# Patient Record
Sex: MALE | Race: BLACK OR AFRICAN AMERICAN | NOT HISPANIC OR LATINO | Employment: FULL TIME | URBAN - METROPOLITAN AREA
[De-identification: names, ages, dates, MRNs, and addresses within clinical notes are randomized per-mention and may not be internally consistent; named-entity substitution may affect disease eponyms.]

---

## 2019-02-26 ENCOUNTER — OFFICE VISIT (OUTPATIENT)
Dept: INTERNAL MEDICINE | Facility: CLINIC | Age: 39
End: 2019-02-26
Payer: COMMERCIAL

## 2019-02-26 VITALS
RESPIRATION RATE: 18 BRPM | OXYGEN SATURATION: 98 % | BODY MASS INDEX: 31.65 KG/M2 | DIASTOLIC BLOOD PRESSURE: 59 MMHG | TEMPERATURE: 98.3 F | HEIGHT: 72 IN | HEART RATE: 67 BPM | WEIGHT: 233.7 LBS | SYSTOLIC BLOOD PRESSURE: 105 MMHG

## 2019-02-26 DIAGNOSIS — K21.9 GASTROESOPHAGEAL REFLUX DISEASE, ESOPHAGITIS PRESENCE NOT SPECIFIED: ICD-10-CM

## 2019-02-26 DIAGNOSIS — R56.9 SEIZURES (H): ICD-10-CM

## 2019-02-26 DIAGNOSIS — Z00.00 HEALTHCARE MAINTENANCE: Primary | ICD-10-CM

## 2019-02-26 DIAGNOSIS — H54.40 BLINDNESS OF LEFT EYE: ICD-10-CM

## 2019-02-26 LAB
ERYTHROCYTE [DISTWIDTH] IN BLOOD BY AUTOMATED COUNT: 14 % (ref 10–15)
HCT VFR BLD AUTO: 44.9 % (ref 40–53)
HGB BLD-MCNC: 14.9 G/DL (ref 13.3–17.7)
MCH RBC QN AUTO: 30.8 PG (ref 26.5–33)
MCHC RBC AUTO-ENTMCNC: 33.2 G/DL (ref 31.5–36.5)
MCV RBC AUTO: 93 FL (ref 78–100)
PHENYTOIN SERPL-MCNC: 17.6 MG/L (ref 10–20)
PLATELET # BLD AUTO: 187 10E9/L (ref 150–450)
RBC # BLD AUTO: 4.83 10E12/L (ref 4.4–5.9)
WBC # BLD AUTO: 5.6 10E9/L (ref 4–11)

## 2019-02-26 PROCEDURE — 99385 PREV VISIT NEW AGE 18-39: CPT | Performed by: NURSE PRACTITIONER

## 2019-02-26 PROCEDURE — 36415 COLL VENOUS BLD VENIPUNCTURE: CPT | Performed by: NURSE PRACTITIONER

## 2019-02-26 PROCEDURE — 84443 ASSAY THYROID STIM HORMONE: CPT | Performed by: NURSE PRACTITIONER

## 2019-02-26 PROCEDURE — 84460 ALANINE AMINO (ALT) (SGPT): CPT | Performed by: NURSE PRACTITIONER

## 2019-02-26 PROCEDURE — 80061 LIPID PANEL: CPT | Performed by: NURSE PRACTITIONER

## 2019-02-26 PROCEDURE — 80048 BASIC METABOLIC PNL TOTAL CA: CPT | Performed by: NURSE PRACTITIONER

## 2019-02-26 PROCEDURE — 85027 COMPLETE CBC AUTOMATED: CPT | Performed by: NURSE PRACTITIONER

## 2019-02-26 PROCEDURE — 80185 ASSAY OF PHENYTOIN TOTAL: CPT | Performed by: NURSE PRACTITIONER

## 2019-02-26 RX ORDER — LANSOPRAZOLE 30 MG/1
30 CAPSULE, DELAYED RELEASE ORAL DAILY
Qty: 90 CAPSULE | Refills: 3 | Status: SHIPPED | OUTPATIENT
Start: 2019-02-26 | End: 2020-02-28

## 2019-02-26 RX ORDER — LANSOPRAZOLE 30 MG/1
30 CAPSULE, DELAYED RELEASE ORAL DAILY
COMMUNITY
End: 2019-02-26

## 2019-02-26 ASSESSMENT — ENCOUNTER SYMPTOMS
NERVOUS/ANXIOUS: 0
ABDOMINAL PAIN: 0
DIARRHEA: 0
HEMATOCHEZIA: 0
HEMATURIA: 0
DIZZINESS: 0
CONSTIPATION: 0
CHILLS: 0
EYE PAIN: 0
FEVER: 0

## 2019-02-26 ASSESSMENT — MIFFLIN-ST. JEOR: SCORE: 2018.06

## 2019-02-26 NOTE — LETTER
February 28, 2019      Sophy Lozano  [address] [facility] [address]        Dear ,    We are writing to inform you of your test results.  Your cholesterol is moderately elevated. I would like you to work harder on your diet, exercise, and weight loss for now. You will need a follow up fasting cholesterol in 6 months. Your glucose was also mildly elevated.  The rest of your results were in normal range.       Resulted Orders   CBC with platelets   Result Value Ref Range    WBC 5.6 4.0 - 11.0 10e9/L    RBC Count 4.83 4.4 - 5.9 10e12/L    Hemoglobin 14.9 13.3 - 17.7 g/dL    Hematocrit 44.9 40.0 - 53.0 %    MCV 93 78 - 100 fl    MCH 30.8 26.5 - 33.0 pg    MCHC 33.2 31.5 - 36.5 g/dL    RDW 14.0 10.0 - 15.0 %    Platelet Count 187 150 - 450 10e9/L   Basic metabolic panel   Result Value Ref Range    Sodium 140 133 - 144 mmol/L    Potassium 4.5 3.4 - 5.3 mmol/L    Chloride 105 94 - 109 mmol/L    Carbon Dioxide 27 20 - 32 mmol/L    Anion Gap 8 3 - 14 mmol/L    Glucose 101 (H) 70 - 99 mg/dL      Comment:      Fasting specimen    Urea Nitrogen 11 7 - 30 mg/dL    Creatinine 1.10 0.66 - 1.25 mg/dL    GFR Estimate 85 >60 mL/min/[1.73_m2]      Comment:      Non  GFR Calc  Starting 12/18/2018, serum creatinine based estimated GFR (eGFR) will be   calculated using the Chronic Kidney Disease Epidemiology Collaboration   (CKD-EPI) equation.      GFR Estimate If Black >90 >60 mL/min/[1.73_m2]      Comment:       GFR Calc  Starting 12/18/2018, serum creatinine based estimated GFR (eGFR) will be   calculated using the Chronic Kidney Disease Epidemiology Collaboration   (CKD-EPI) equation.      Calcium 9.0 8.5 - 10.1 mg/dL   Lipid Profile   Result Value Ref Range    Cholesterol 244 (H) <200 mg/dL      Comment:      Desirable:       <200 mg/dl    Triglycerides 220 (H) <150 mg/dL      Comment:      Borderline high:  150-199 mg/dl  High:             200-499 mg/dl  Very high:       >499  mg/dl  Fasting specimen      HDL Cholesterol 46 >39 mg/dL    LDL Cholesterol Calculated 154 (H) <100 mg/dL      Comment:      Above desirable:  100-129 mg/dl  Borderline High:  130-159 mg/dL  High:             160-189 mg/dL  Very high:       >189 mg/dl      Non HDL Cholesterol 198 (H) <130 mg/dL      Comment:      Above Desirable:  130-159 mg/dl  Borderline high:  160-189 mg/dl  High:             190-219 mg/dl  Very high:       >219 mg/dl     ALT   Result Value Ref Range    ALT 28 0 - 70 U/L   TSH with free T4 reflex   Result Value Ref Range    TSH 2.39 0.40 - 4.00 mU/L   Phenytoin level   Result Value Ref Range    Phenytoin Level 17.6 10 - 20 mg/L       If you have any questions or concerns, please call the clinic at the number listed above.       Sincerely,        Rica Ramirez NP

## 2019-02-26 NOTE — PROGRESS NOTES
SUBJECTIVE:   CC: Sophy Lozano is an 39 year old male who presents for preventative health visit.     Physical   Annual:     Getting at least 3 servings of Calcium per day:  Yes    Bi-annual eye exam:  Yes    Dental care twice a year:  Yes    Sleep apnea or symptoms of sleep apnea:  Daytime drowsiness    Diet:  Regular (no restrictions)    Frequency of exercise:  1 day/week    Duration of exercise:  45-60 minutes    Taking medications regularly:  Yes    Medication side effects:  Not applicable    Additional concerns today:  No    PHQ-2 Total Score: 0            Today's PHQ-2 Score:   PHQ-2 ( 1999 [employer]) 2/26/2019   Q1: Little interest or pleasure in doing things 0   Q2: Feeling down, depressed or hopeless 0   PHQ-2 Score 0   Q1: Little interest or pleasure in doing things Not at all   Q2: Feeling down, depressed or hopeless Not at all   PHQ-2 Score 0       Abuse: Current or Past(Physical, Sexual or Emotional)- No  Do you feel safe in your environment? Yes    Social History     Tobacco Use     Smoking status: Never Smoker     Smokeless tobacco: Never Used   Substance Use Topics     Alcohol use: No     Alcohol Use 2/26/2019   If you drink alcohol do you typically have greater than 3 drinks per day OR greater than 7 drinks per week? Not Applicable       Last PSA: No results found for: PSA    Reviewed orders with patient. Reviewed health maintenance and updated orders accordingly - Yes  BP Readings from Last 3 Encounters:   02/26/19 105/59   08/05/15 122/82   10/10/14 110/47    Wt Readings from Last 3 Encounters:   02/26/19 106 kg (233 lb 11.2 oz)                  Patient Active Problem List   Diagnosis     Seizures (H)     Blindness of left eye     GERD (gastroesophageal reflux disease)     Past Surgical History:   Procedure Laterality Date     AS REMOVAL OF EYE         Social History     Tobacco Use     Smoking status: Never Smoker     Smokeless tobacco: Never Used   Substance Use Topics     Alcohol use: No     Family  History   Problem Relation Age of Onset     Diabetes Mother      Coronary Artery Disease Father          Current Outpatient Medications   Medication Sig Dispense Refill     Acetaminophen (TYLENOL PO)        LANsoprazole (PREVACID) 30 MG DR capsule Take 1 capsule (30 mg) by mouth daily 90 capsule 3     Phenytoin (DILANTIN PO)        aspirin-acetaminophen-caffeine (EXCEDRIN MIGRAINE) 250-250-65 MG per tablet Take 1 tablet by mouth every 6 hours as needed for headaches         Reviewed and updated as needed this visit by clinical staff  Tobacco  Allergies  Meds  Med Hx  Surg Hx  Fam Hx  Soc Hx        Reviewed and updated as needed this visit by Provider            Review of Systems   Constitutional: Negative for chills and fever.   HENT: Negative for congestion and ear pain.    Eyes: Negative for pain.   Cardiovascular: Negative for chest pain.   Gastrointestinal: Negative for abdominal pain, constipation, diarrhea and hematochezia.   Genitourinary: Negative for hematuria.   Neurological: Negative for dizziness.   Psychiatric/Behavioral: The patient is not nervous/anxious.        OBJECTIVE:   /59 (BP Location: Left arm, Patient Position: Sitting, Cuff Size: Adult Regular)   Pulse 67   Temp 98.3  F (36.8  C) (Oral)   Resp 18   Ht 1.829 m (6')   Wt 106 kg (233 lb 11.2 oz)   SpO2 98%   BMI 31.70 kg/m        Physical Exam  GENERAL: healthy, alert and no distress  HENT: ear canals and TM's normal, nose and mouth without ulcers or lesions  NECK: no adenopathy, no asymmetry, masses, or scars and thyroid normal to palpation  RESP: lungs clear to auscultation - no rales, rhonchi or wheezes  CV: regular rate and rhythm, normal S1 S2, no S3 or S4, no murmur, click or rub, no peripheral edema and peripheral pulses strong  ABDOMEN: soft, nontender, no hepatosplenomegaly, no masses and bowel sounds normal  MS: no gross musculoskeletal defects noted, no edema  PSYCH: mentation appears normal, affect  normal/bright        ASSESSMENT/PLAN:       ICD-10-CM    1. Healthcare maintenance Z00.00 CBC with platelets     Basic metabolic panel     Lipid Profile     ALT     TSH with free T4 reflex   2. Seizures (H) R56.9 Phenytoin level   3. Blindness of left eye H54.40    4. Gastroesophageal reflux disease, esophagitis presence not specified K21.9 LANsoprazole (PREVACID) 30 MG DR capsule       COUNSELING:   Reviewed preventive health counseling, as reflected in patient instructions    BP Readings from Last 1 Encounters:   08/05/15 122/82     There is no height or weight on file to calculate BMI.           reports that  has never smoked. he has never used smokeless tobacco.      Counseling Resources:  ATP IV Guidelines  Pooled Cohorts Equation Calculator  FRAX Risk Assessment  ICSI Preventive Guidelines  Dietary Guidelines for Americans, 2010  USDA's MyPlate  ASA Prophylaxis  Lung CA Screening    Rica Ramirez NP  [facility]

## 2019-02-27 LAB
ALT SERPL W P-5'-P-CCNC: 28 U/L (ref 0–70)
ANION GAP SERPL CALCULATED.3IONS-SCNC: 8 MMOL/L (ref 3–14)
BUN SERPL-MCNC: 11 MG/DL (ref 7–30)
CALCIUM SERPL-MCNC: 9 MG/DL (ref 8.5–10.1)
CHLORIDE SERPL-SCNC: 105 MMOL/L (ref 94–109)
CHOLEST SERPL-MCNC: 244 MG/DL
CO2 SERPL-SCNC: 27 MMOL/L (ref 20–32)
CREAT SERPL-MCNC: 1.1 MG/DL (ref 0.66–1.25)
GFR SERPL CREATININE-BSD FRML MDRD: 85 ML/MIN/{1.73_M2}
GLUCOSE SERPL-MCNC: 101 MG/DL (ref 70–99)
HDLC SERPL-MCNC: 46 MG/DL
LDLC SERPL CALC-MCNC: 154 MG/DL
NONHDLC SERPL-MCNC: 198 MG/DL
POTASSIUM SERPL-SCNC: 4.5 MMOL/L (ref 3.4–5.3)
SODIUM SERPL-SCNC: 140 MMOL/L (ref 133–144)
TRIGL SERPL-MCNC: 220 MG/DL
TSH SERPL DL<=0.005 MIU/L-ACNC: 2.39 MU/L (ref 0.4–4)

## 2019-03-18 ENCOUNTER — TELEPHONE (OUTPATIENT)
Dept: OTHER | Facility: CLINIC | Age: 39
End: 2019-03-18

## 2019-04-05 DIAGNOSIS — R56.9 SEIZURES (H): Primary | ICD-10-CM

## 2019-04-05 NOTE — TELEPHONE ENCOUNTER
Sophy is calling in to request a refill of his Dilantin medication. He would like the medication sent to the [facility] on [address]. Please call him back at [phone] if there are any problems with filling this prescription.

## 2019-04-08 RX ORDER — PHENYTOIN SODIUM 100 MG/1
200 CAPSULE, EXTENDED RELEASE ORAL AT BEDTIME
Qty: 180 CAPSULE | Refills: 3 | Status: SHIPPED | OUTPATIENT
Start: 2019-04-08 | End: 2019-04-16

## 2019-04-08 NOTE — TELEPHONE ENCOUNTER
"Requested Prescriptions   Pending Prescriptions Disp Refills     phenytoin (DILANTIN) 100 MG capsule  Last Written Prescription Date:  historic  Last Fill Quantity: n/a,  # refills: n/a   Last office visit: 2/26/2019 with prescribing provider:  Ashley   Future Office Visit:          Sig: Take 2 capsules (200 mg) by mouth At Bedtime       Anti-Seizure Meds Protocol  Failed - 4/8/2019  9:45 AM        Failed - Recent (12 mo) or future (30 days) visit within the authorizing provider's specialty     Patient had office visit in the last 12 months or has a visit in the next 30 days with authorizing provider or within the authorizing provider's specialty.  See \"Patient Info\" tab in inbasket, or \"Choose Columns\" in Meds & Orders section of the refill encounter.              Failed - Review Authorizing provider's last note.      Refer to last progress notes: confirm request is for original authorizing provider (cannot be through other providers).          Failed - Medication is active on med list        Passed - Normal CBC on file in past 26 months     Recent Labs   Lab Test 02/26/19  0823   WBC 5.6   RBC 4.83   HGB 14.9   HCT 44.9                    Passed - Normal ALT or AST on file in past 26 months     Recent Labs   Lab Test 02/26/19  0823   ALT 28     No lab results found.          Passed - Normal platelet count on file in past 26 months     Recent Labs   Lab Test 02/26/19  0823                  Passed - Dilantin level within therapeutic range in past 26 months     Recent Labs   Lab Test 02/26/19  0823   DILANTIN 17.6       Dilantin level must be checked 2-4 weeks after dosage change.        Routing refill request to provider for review/approval because:  Medication is reported/historical       "

## 2019-04-16 ENCOUNTER — TELEPHONE (OUTPATIENT)
Dept: INTERNAL MEDICINE | Facility: CLINIC | Age: 39
End: 2019-04-16

## 2019-04-16 DIAGNOSIS — R56.9 SEIZURES (H): ICD-10-CM

## 2019-04-16 NOTE — TELEPHONE ENCOUNTER
Patient left voice message requesting name brand Dilantin instead of generic.  Contacted patient for more information regarding request. Patient states his previous doctor had him take generic phenytoin in the past, but they couldn't get his phenytoin level into therapeutic range. Previous doctor changed prescription to brand name Dilantin and then was able to get phenytoin level in therapeutic range.    Order pended as KAREN for brand name Dilantin.

## 2019-04-17 RX ORDER — PHENYTOIN SODIUM 100 MG/1
200 CAPSULE, EXTENDED RELEASE ORAL AT BEDTIME
Qty: 180 CAPSULE | Refills: 3 | Status: SHIPPED | OUTPATIENT
Start: 2019-04-17 | End: 2020-03-02

## 2020-01-13 ENCOUNTER — OFFICE VISIT (OUTPATIENT)
Dept: FAMILY MEDICINE | Facility: CLINIC | Age: 40
End: 2020-01-13
Payer: COMMERCIAL

## 2020-01-13 VITALS
BODY MASS INDEX: 31.56 KG/M2 | HEART RATE: 75 BPM | WEIGHT: 233 LBS | SYSTOLIC BLOOD PRESSURE: 114 MMHG | HEIGHT: 72 IN | DIASTOLIC BLOOD PRESSURE: 76 MMHG | OXYGEN SATURATION: 99 % | TEMPERATURE: 97 F

## 2020-01-13 DIAGNOSIS — Z01.818 PREOP GENERAL PHYSICAL EXAM: Primary | ICD-10-CM

## 2020-01-13 DIAGNOSIS — Z90.01 H/O ENUCLEATION OF LEFT EYEBALL: ICD-10-CM

## 2020-01-13 DIAGNOSIS — H00.025 HORDEOLUM INTERNUM LEFT LOWER EYELID: ICD-10-CM

## 2020-01-13 PROCEDURE — 99214 OFFICE O/P EST MOD 30 MIN: CPT | Performed by: NURSE PRACTITIONER

## 2020-01-13 ASSESSMENT — MIFFLIN-ST. JEOR: SCORE: 2009.88

## 2020-01-13 NOTE — PROGRESS NOTES
[address] [address]  [phone]  Dept: [phone]    PRE-OP EVALUATION:  Today's date: 2020    Sophy Lozano (: 1980) presents for pre-operative evaluation assessment as requested by Dr. Brenna García.  He requires evaluation and anesthesia risk assessment prior to undergoing surgery/procedure for treatment of Stye in Left Eye .    Proposed Surgery/ Procedure: excision of stye in left eye   Date of Surgery/ Procedure: 2020   Time of Surgery/ Procedure: 2:00 pm   Hospital/Surgical Facility: [facility]  [phone]  Primary Physician: Rica Ramirez  Type of Anesthesia Anticipated: to be determined    Patient has a Health Care Directive or Living Will:  NO    1. NO - Do you have a history of heart attack, stroke, stent, bypass or surgery on an artery in the head, neck, heart or legs?  2. NO - Do you ever have any pain or discomfort in your chest?  3. NO - Do you have a history of  Heart Failure?  4. NO - Are you troubled by shortness of breath when: walking on the level, up a slight hill or at night?  5. NO - Do you currently have a cold, bronchitis or other respiratory infection?  6. NO - Do you have a cough, shortness of breath or wheezing?  7. NO - Do you sometimes get pains in the calves of your legs when you walk?  8. NO - Do you or anyone in your family have previous history of blood clots?  9. NO - Do you or does anyone in your family have a serious bleeding problem such as prolonged bleeding following surgeries or cuts?  10. NO - Have you ever had problems with anemia or been told to take iron pills?  11. NO - Have you had any abnormal blood loss such as black, tarry or bloody stools, or abnormal vaginal bleeding?  12. NO - Have you ever had a blood transfusion?  13. NO - Have you or any of your relatives ever had problems with anesthesia?  14. NO - Do you have sleep apnea, excessive snoring or daytime  drowsiness?  15. NO - Do you have any prosthetic heart valves?  16. NO - Do you have prosthetic joints?  17. NO - Is there any chance that you may be pregnant?      HPI:     HPI related to upcoming procedure: left prosthetic eye, previously had left lower lid stye excision a year ago but incomplete and requiring re excision      See problem list for active medical problems.  Problems all longstanding and stable, except as noted/documented.  See ROS for pertinent symptoms related to these conditions.    H/O seizures and on dilantin - last seizure approximately 10 years ago    MEDICAL HISTORY:     Patient Active Problem List    Diagnosis Date Noted     Seizures (H)      Priority: Medium     Blindness of left eye      Priority: Medium     GERD (gastroesophageal reflux disease)      Priority: Medium      Past Medical History:   Diagnosis Date     Blindness of left eye      GERD (gastroesophageal reflux disease)      Seizures (H)      Past Surgical History:   Procedure Laterality Date     AS REMOVAL OF EYE       Current Outpatient Medications   Medication Sig Dispense Refill     Acetaminophen (TYLENOL PO)        aspirin-acetaminophen-caffeine (EXCEDRIN MIGRAINE) 250-250-65 MG per tablet Take 1 tablet by mouth every 6 hours as needed for headaches       DILANTIN 100 MG capsule Take 2 capsules (200 mg) by mouth At Bedtime 180 capsule 3     LANsoprazole (PREVACID) 30 MG DR capsule Take 1 capsule (30 mg) by mouth daily 90 capsule 3     OTC products: None, except as noted above    No Known Allergies   Latex Allergy: NO    Social History     Tobacco Use     Smoking status: Never Smoker     Smokeless tobacco: Never Used   Substance Use Topics     Alcohol use: No     History   Drug Use No       REVIEW OF SYSTEMS:   CONSTITUTIONAL: NEGATIVE for fever, chills, change in weight  EENT/MOUTH: NEGATIVE for ear, mouth and throat problems;  POSITIVE for tender erythematous swelling of left lower lid at inner canthus  RESP: NEGATIVE for  significant cough or SOB  CV: NEGATIVE for chest pain, palpitations or peripheral edema  GI: NEGATIVE for nausea, abdominal pain, heartburn, or change in bowel habits  : negative for dysuria, hematuria,   EXAM:   /76 (BP Location: Right arm, Patient Position: Sitting, Cuff Size: Adult Large)   Pulse 75   Temp 97  F (36.1  C) (Oral)   Ht 1.829 m (6')   Wt 105.7 kg (233 lb)   SpO2 99%   BMI 31.60 kg/m    GENERAL APPEARANCE: healthy, alert and no distress  HENT: ear canals and TM's normal and nose and mouth without ulcers or lesions  RESP: lungs clear to auscultation - no rales, rhonchi or wheezes  CV: regular rate and rhythm, normal S1 S2, no S3 or S4 and no murmur, click or rub   ABDOMEN: soft, nontender, no HSM or masses and bowel sounds normal  NEURO: Normal strength and tone, sensory exam grossly normal, mentation intact and speech normal    DIAGNOSTICS:   No labs or EKG required for low risk surgery (cataract, skin procedure, breast biopsy, etc)    Recent Labs   Lab Test 02/26/19  0823 10/09/14  2225   HGB 14.9 14.8    217   INR  --  1.04    139   POTASSIUM 4.5 3.8   CR 1.10 1.12        IMPRESSION:   Reason for surgery/procedure: excision stye left lower eyelid  Diagnosis/reason for consult: pre op consult    The proposed surgical procedure is considered LOW risk.    REVISED CARDIAC RISK INDEX  The patient has the following serious cardiovascular risks for perioperative complications such as (MI, PE, VFib and 3  AV Block):  No serious cardiac risks  INTERPRETATION: 0 risks: Class I (very low risk - 0.4% complication rate)    The patient has the following additional risks for perioperative complications:  No identified additional risks      ICD-10-CM    1. Preop general physical exam Z01.818    2. Hordeolum internum left lower eyelid H00.025    3. 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    [facility] Preop Guidelines    Revised Cardiac Risk Index

## 2020-01-14 ENCOUNTER — HOSPITAL PATHOLOGY (OUTPATIENT)
Dept: OTHER | Facility: CLINIC | Age: 40
End: 2020-01-14

## 2020-01-16 LAB — COPATH REPORT: NORMAL

## 2020-02-27 DIAGNOSIS — K21.9 GASTROESOPHAGEAL REFLUX DISEASE, ESOPHAGITIS PRESENCE NOT SPECIFIED: ICD-10-CM

## 2020-02-28 RX ORDER — LANSOPRAZOLE 30 MG/1
CAPSULE, DELAYED RELEASE ORAL
Qty: 90 CAPSULE | Refills: 0 | Status: SHIPPED | OUTPATIENT
Start: 2020-02-28 | End: 2020-03-02

## 2020-02-28 NOTE — TELEPHONE ENCOUNTER
"Pt has appt on Monday.     Prescription approved per [facility] Refill Protocol.    Requested Prescriptions   Pending Prescriptions Disp Refills     LANsoprazole (PREVACID) 30 MG DR capsule [Pharmacy Med Name: LANSOPRAZOLE 30MG DR CAPSULES] 90 capsule 0     Sig: TAKE 1 CAPSULE(30 MG) BY MOUTH DAILY       PPI Protocol Passed - 2/27/2020  1:24 PM        Passed - Not on Clopidogrel (unless Pantoprazole ordered)        Passed - No diagnosis of osteoporosis on record        Passed - Recent (12 mo) or future (30 days) visit within the authorizing provider's specialty     Patient has had an office visit with the authorizing provider or a provider within the authorizing providers department within the previous 12 mos or has a future within next 30 days. See \"Patient Info\" tab in inbasket, or \"Choose Columns\" in Meds & Orders section of the refill encounter.              Passed - Medication is active on med list        Passed - Patient is age 18 or older          "

## 2020-03-02 ENCOUNTER — OFFICE VISIT (OUTPATIENT)
Dept: INTERNAL MEDICINE | Facility: CLINIC | Age: 40
End: 2020-03-02
Payer: COMMERCIAL

## 2020-03-02 VITALS
DIASTOLIC BLOOD PRESSURE: 62 MMHG | HEIGHT: 72 IN | HEART RATE: 71 BPM | BODY MASS INDEX: 30.64 KG/M2 | TEMPERATURE: 98.5 F | OXYGEN SATURATION: 100 % | RESPIRATION RATE: 16 BRPM | WEIGHT: 226.2 LBS | SYSTOLIC BLOOD PRESSURE: 106 MMHG

## 2020-03-02 DIAGNOSIS — Z23 NEED FOR PROPHYLACTIC VACCINATION AND INOCULATION AGAINST INFLUENZA: ICD-10-CM

## 2020-03-02 DIAGNOSIS — Z00.00 HEALTHCARE MAINTENANCE: ICD-10-CM

## 2020-03-02 DIAGNOSIS — R56.9 SEIZURES (H): Primary | ICD-10-CM

## 2020-03-02 DIAGNOSIS — K21.9 GASTROESOPHAGEAL REFLUX DISEASE, ESOPHAGITIS PRESENCE NOT SPECIFIED: ICD-10-CM

## 2020-03-02 LAB
ERYTHROCYTE [DISTWIDTH] IN BLOOD BY AUTOMATED COUNT: 13.8 % (ref 10–15)
HCT VFR BLD AUTO: 45.1 % (ref 40–53)
HGB BLD-MCNC: 14.8 G/DL (ref 13.3–17.7)
MCH RBC QN AUTO: 30.5 PG (ref 26.5–33)
MCHC RBC AUTO-ENTMCNC: 32.8 G/DL (ref 31.5–36.5)
MCV RBC AUTO: 93 FL (ref 78–100)
PHENYTOIN SERPL-MCNC: 18.9 MG/L (ref 10–20)
PLATELET # BLD AUTO: 200 10E9/L (ref 150–450)
RBC # BLD AUTO: 4.86 10E12/L (ref 4.4–5.9)
WBC # BLD AUTO: 4.6 10E9/L (ref 4–11)

## 2020-03-02 PROCEDURE — 80061 LIPID PANEL: CPT | Performed by: NURSE PRACTITIONER

## 2020-03-02 PROCEDURE — 99214 OFFICE O/P EST MOD 30 MIN: CPT | Mod: 25 | Performed by: NURSE PRACTITIONER

## 2020-03-02 PROCEDURE — 36415 COLL VENOUS BLD VENIPUNCTURE: CPT | Performed by: NURSE PRACTITIONER

## 2020-03-02 PROCEDURE — 90682 RIV4 VACC RECOMBINANT DNA IM: CPT | Performed by: NURSE PRACTITIONER

## 2020-03-02 PROCEDURE — 90471 IMMUNIZATION ADMIN: CPT | Performed by: NURSE PRACTITIONER

## 2020-03-02 PROCEDURE — 80185 ASSAY OF PHENYTOIN TOTAL: CPT | Performed by: NURSE PRACTITIONER

## 2020-03-02 PROCEDURE — 84460 ALANINE AMINO (ALT) (SGPT): CPT | Performed by: NURSE PRACTITIONER

## 2020-03-02 PROCEDURE — 84443 ASSAY THYROID STIM HORMONE: CPT | Performed by: NURSE PRACTITIONER

## 2020-03-02 PROCEDURE — 80048 BASIC METABOLIC PNL TOTAL CA: CPT | Performed by: NURSE PRACTITIONER

## 2020-03-02 PROCEDURE — 85027 COMPLETE CBC AUTOMATED: CPT | Performed by: NURSE PRACTITIONER

## 2020-03-02 RX ORDER — PHENYTOIN SODIUM 100 MG/1
200 CAPSULE, EXTENDED RELEASE ORAL AT BEDTIME
Qty: 180 CAPSULE | Refills: 3 | Status: SHIPPED | OUTPATIENT
Start: 2020-03-02 | End: 2021-03-04

## 2020-03-02 RX ORDER — LANSOPRAZOLE 30 MG/1
CAPSULE, DELAYED RELEASE ORAL
Qty: 90 CAPSULE | Refills: 0 | Status: SHIPPED | OUTPATIENT
Start: 2020-03-02 | End: 2020-05-22

## 2020-03-02 ASSESSMENT — MIFFLIN-ST. JEOR: SCORE: 1979.04

## 2020-03-02 NOTE — NURSING NOTE
Patient here for medication check on Dilantin.  /62 (BP Location: Right arm, Patient Position: Sitting, Cuff Size: Adult Large)   Pulse 71   Temp 98.5  F (36.9  C) (Oral)   Resp 16   Ht 1.829 m (6')   Wt 102.6 kg (226 lb 3.2 oz)   SpO2 100%   BMI 30.68 kg/m

## 2020-03-02 NOTE — PROGRESS NOTES
Subjective     Sophy Lozano is a 40 year old male who presents to clinic today for the following health issues:    HPI   GERD/Heartburn      Duration: years    Description (location/character/radiation): epigastric    Intensity:  mild    Accompanying signs and symptoms:  food getting stuck: no   nausea/vomiting/blood: no   abdominal pain: no   black/tarry or bloody stools: no :    History (similar episodes/previous evaluation): h/oGERD    Precipitating or alleviating factors:  worse with no particular food or drink.  current NSAID/Aspirin use: no     Therapies tried and outcome: Omeprazole (Prilosec) and medication helpful    H/o seizures,none in years, no longer f/u neurology    Patient Active Problem List   Diagnosis     Seizures (H)     Blindness of left eye     GERD (gastroesophageal reflux disease)     Past Surgical History:   Procedure Laterality Date     AS REMOVAL OF EYE         Social History     Tobacco Use     Smoking status: Never Smoker     Smokeless tobacco: Never Used   Substance Use Topics     Alcohol use: No     Family History   Problem Relation Age of Onset     Diabetes Mother      Coronary Artery Disease Father          Current Outpatient Medications   Medication Sig Dispense Refill     Acetaminophen (TYLENOL PO)        aspirin-acetaminophen-caffeine (EXCEDRIN MIGRAINE) 250-250-65 MG per tablet Take 1 tablet by mouth every 6 hours as needed for headaches       DILANTIN 100 MG capsule Take 2 capsules (200 mg) by mouth At Bedtime 180 capsule 3     LANsoprazole (PREVACID) 30 MG DR capsule TAKE 1 CAPSULE(30 MG) BY MOUTH DAILY 90 capsule 0     BP Readings from Last 3 Encounters:   03/02/20 106/62   01/13/20 114/76   02/26/19 105/59    Wt Readings from Last 3 Encounters:   03/02/20 102.6 kg (226 lb 3.2 oz)   01/13/20 105.7 kg (233 lb)   02/26/19 106 kg (233 lb 11.2 oz)                      Reviewed and updated as needed this visit by Provider         Review of Systems   ROS COMP: CONSTITUTIONAL: NEGATIVE  for fever, chills, change in weight  ENT/MOUTH: NEGATIVE for ear, mouth and throat problems  RESP: NEGATIVE for significant cough or SOB  CV: NEGATIVE for chest pain, palpitations or peripheral edema  GI: NEGATIVE for nausea, abdominal pain, heartburn, or change in bowel habits  MUSCULOSKELETAL: NEGATIVE for significant arthralgias or myalgia  NEURO: NEGATIVE for weakness, dizziness or paresthesias  ROS otherwise negative      Objective    /62 (BP Location: Right arm, Patient Position: Sitting, Cuff Size: Adult Large)   Pulse 71   Temp 98.5  F (36.9  C) (Oral)   Resp 16   Ht 1.829 m (6')   Wt 102.6 kg (226 lb 3.2 oz)   SpO2 100%   BMI 30.68 kg/m    Body mass index is 30.68 kg/m .  Physical Exam   GENERAL: healthy, alert and no distress  RESP: lungs clear to auscultation - no rales, rhonchi or wheezes  CV: regular rate and rhythm, normal S1 S2, no S3 or S4, no murmur, click or rub, no peripheral edema and peripheral pulses strong            Assessment & Plan       ICD-10-CM    1. Seizures (H) R56.9 DILANTIN 100 MG capsule     Phenytoin level   2. Gastroesophageal reflux disease, esophagitis presence not specified K21.9 LANsoprazole (PREVACID) 30 MG DR capsule   3. Healthcare maintenance Z00.00 CBC with platelets     Basic metabolic panel     ALT     Lipid panel reflex to direct LDL Non-fasting     TSH with free T4 reflex   4. Need for prophylactic vaccination and inoculation against influenza Z23 Vaccine Administration, Initial [76986]     FLU VAC, QUADRIVALENT (RIV4) RECOMBINANT DNA, IM     CANCELED: INFLUENZA VACCINE IM > 6 MONTHS VALENT IIV4 [29543]        BMI:   Estimated body mass index is 30.68 kg/m  as calculated from the following:    Height as of this encounter: 1.829 m (6').    Weight as of this encounter: 102.6 kg (226 lb 3.2 oz).         F/u 6 months and prn    Rica Ramirez NP  [facility]

## 2020-03-03 LAB
ALT SERPL W P-5'-P-CCNC: 26 U/L (ref 0–70)
ANION GAP SERPL CALCULATED.3IONS-SCNC: 2 MMOL/L (ref 3–14)
BUN SERPL-MCNC: 12 MG/DL (ref 7–30)
CALCIUM SERPL-MCNC: 8.7 MG/DL (ref 8.5–10.1)
CHLORIDE SERPL-SCNC: 106 MMOL/L (ref 94–109)
CHOLEST SERPL-MCNC: 210 MG/DL
CO2 SERPL-SCNC: 29 MMOL/L (ref 20–32)
CREAT SERPL-MCNC: 1.01 MG/DL (ref 0.66–1.25)
GFR SERPL CREATININE-BSD FRML MDRD: >90 ML/MIN/{1.73_M2}
GLUCOSE SERPL-MCNC: 85 MG/DL (ref 70–99)
HDLC SERPL-MCNC: 46 MG/DL
LDLC SERPL CALC-MCNC: 127 MG/DL
NONHDLC SERPL-MCNC: 164 MG/DL
POTASSIUM SERPL-SCNC: 4.1 MMOL/L (ref 3.4–5.3)
SODIUM SERPL-SCNC: 137 MMOL/L (ref 133–144)
TRIGL SERPL-MCNC: 186 MG/DL
TSH SERPL DL<=0.005 MIU/L-ACNC: 2.21 MU/L (ref 0.4–4)

## 2020-05-22 DIAGNOSIS — K21.9 GASTROESOPHAGEAL REFLUX DISEASE, ESOPHAGITIS PRESENCE NOT SPECIFIED: ICD-10-CM

## 2020-05-22 RX ORDER — LANSOPRAZOLE 30 MG/1
CAPSULE, DELAYED RELEASE ORAL
Qty: 90 CAPSULE | Refills: 3 | Status: SHIPPED | OUTPATIENT
Start: 2020-05-22 | End: 2021-03-04

## 2020-12-20 ENCOUNTER — HEALTH MAINTENANCE LETTER (OUTPATIENT)
Age: 40
End: 2020-12-20

## 2021-03-04 ENCOUNTER — VIRTUAL VISIT (OUTPATIENT)
Dept: INTERNAL MEDICINE | Facility: CLINIC | Age: 41
End: 2021-03-04
Payer: COMMERCIAL

## 2021-03-04 DIAGNOSIS — Z00.00 HEALTHCARE MAINTENANCE: ICD-10-CM

## 2021-03-04 DIAGNOSIS — K21.00 GASTROESOPHAGEAL REFLUX DISEASE WITH ESOPHAGITIS WITHOUT HEMORRHAGE: Primary | ICD-10-CM

## 2021-03-04 DIAGNOSIS — R56.9 SEIZURES (H): ICD-10-CM

## 2021-03-04 PROCEDURE — 99214 OFFICE O/P EST MOD 30 MIN: CPT | Mod: TEL | Performed by: NURSE PRACTITIONER

## 2021-03-04 RX ORDER — LANSOPRAZOLE 30 MG/1
CAPSULE, DELAYED RELEASE ORAL
Qty: 90 CAPSULE | Refills: 3 | Status: SHIPPED | OUTPATIENT
Start: 2021-03-04 | End: 2022-03-30

## 2021-03-04 RX ORDER — PHENYTOIN SODIUM 100 MG/1
200 CAPSULE, EXTENDED RELEASE ORAL AT BEDTIME
Qty: 180 CAPSULE | Refills: 3 | Status: SHIPPED | OUTPATIENT
Start: 2021-03-04 | End: 2022-03-30

## 2021-03-04 NOTE — PROGRESS NOTES
Sophy is a 41 year old who is being evaluated via a billable video visit.      How would you like to obtain your AVS? MyChart  If the video visit is dropped, the invitation should be resent by: Text to cell phone:  [phone]  Will anyone else be joining your video visit? No    Video Start Time: 1640    Assessment & Plan     Seizures (H)    - DILANTIN 100 MG capsule; Take 2 capsules (200 mg) by mouth At Bedtime  - Phenytoin level    Gastroesophageal reflux disease with esophagitis without hemorrhage    - LANsoprazole (PREVACID) 30 MG DR capsule; TAKE 1 CAPSULE(30 MG) BY MOUTH DAILY    Healthcare maintenance    - **ALT FUTURE anytime; Future  - **Basic metabolic panel FUTURE anytime; Future  - **CBC with platelets FUTURE anytime; Future  - Lipid panel reflex to direct LDL Fasting; Future    Labs pending  The current medical regimen is effective;  continue present plan and medications.               Rica Ramirez NP  [facility]   Sophy is a 41 year old who presents for the following health issues     HPI       GERD/Heartburn  Onset/Duration: years   Description: doing well on PPI  Intensity: mild  Progression of Symptoms: same  Accompanying Signs & Symptoms:  Does it feel like food gets stuck or trouble swallowing: no  Nausea: no  Vomiting (bloody?): no  Abdominal Pain: no  Black-Tarry stools: no  Bloody stools: no  History:  Previous similar episodes: YES  Previous ulcers: no  Precipitating factors:   Caffeine use: no  Alcohol use: no  NSAID/Aspirin use: no  Tobacco use: no  Worse with no particular food or drink.  Alleviating factors: PPI  Therapies tried and outcome:             Lifestyle changes: None            Medications: Prevacid    Concern - h/o seizures  No seizures since last visit.  Therapies tried and outcome: dilantin        Review of Systems   Constitutional, HEENT, cardiovascular, pulmonary, gi and gu systems are negative, except as otherwise noted.       Objective           Vitals:  No vitals were obtained today due to virtual visit.    Physical Exam   GENERAL: Healthy, alert and no distress  EYES: Eyes grossly normal to inspection.  No discharge or erythema, or obvious scleral/conjunctival abnormalities.  RESP: No audible wheeze, cough, or visible cyanosis.  No visible retractions or increased work of breathing.    NEURO: Cranial nerves grossly intact.  Mentation and speech appropriate for age.  PSYCH: Mentation appears normal, affect normal/bright, judgement and insight intact, normal speech and appearance well-groomed.              Video-Visit Details    Type of service:  Video Visit    Video End Time:1647    Originating Location (pt. Location): Home    Distant Location (provider location):  [facility]     Platform used for Video Visit: Dania

## 2021-10-03 ENCOUNTER — HEALTH MAINTENANCE LETTER (OUTPATIENT)
Age: 41
End: 2021-10-03

## 2021-12-08 DIAGNOSIS — R56.9 SEIZURES (H): ICD-10-CM

## 2021-12-08 DIAGNOSIS — K21.9 GASTROESOPHAGEAL REFLUX DISEASE: ICD-10-CM

## 2021-12-13 RX ORDER — PHENYTOIN SODIUM 100 MG/1
CAPSULE, EXTENDED RELEASE ORAL
Qty: 180 CAPSULE | Refills: 3 | OUTPATIENT
Start: 2021-12-13

## 2021-12-13 RX ORDER — LANSOPRAZOLE 30 MG/1
TABLET, ORALLY DISINTEGRATING, DELAYED RELEASE ORAL
Qty: 90 TABLET | OUTPATIENT
Start: 2021-12-13

## 2022-01-23 ENCOUNTER — HEALTH MAINTENANCE LETTER (OUTPATIENT)
Age: 42
End: 2022-01-23

## 2022-03-30 DIAGNOSIS — R56.9 SEIZURES (H): ICD-10-CM

## 2022-03-30 DIAGNOSIS — K21.00 GASTROESOPHAGEAL REFLUX DISEASE WITH ESOPHAGITIS WITHOUT HEMORRHAGE: ICD-10-CM

## 2022-03-30 RX ORDER — PHENYTOIN SODIUM 100 MG/1
CAPSULE, EXTENDED RELEASE ORAL
Qty: 180 CAPSULE | Refills: 0 | Status: SHIPPED | OUTPATIENT
Start: 2022-03-30 | End: 2022-04-06

## 2022-03-30 RX ORDER — LANSOPRAZOLE 30 MG/1
CAPSULE, DELAYED RELEASE ORAL
Qty: 90 CAPSULE | Refills: 0 | Status: SHIPPED | OUTPATIENT
Start: 2022-03-30 | End: 2022-04-06

## 2022-03-30 NOTE — TELEPHONE ENCOUNTER
Medication is being filled for 1 time refill only due to:  Patient needs to be seen because it has been more than one year since last visit.     Please contact patient to schedule annual appointment.    Elis Jasmine RN  [facility]

## 2022-03-30 NOTE — LETTER
[facility]  [address] [facility] [address]  Phone: [phone]        April 5, 2022      Sophy Lozano                                                                                                                                [address] [facility] [address]            Dear Beti Blake,    We are concerned about your health care.  We recently provided you with a medication refill.  Many medications require routine follow-up with your Doctor.      At this time we ask that: You schedule a routine office visit with your physician to follow your medications. It has been over a year since Lien has seen you.     Your prescription: Has been refilled for 1 time  so you may have time for the above noted follow-up.      Thank you,      [facility]

## 2022-04-06 ENCOUNTER — VIRTUAL VISIT (OUTPATIENT)
Dept: INTERNAL MEDICINE | Facility: CLINIC | Age: 42
End: 2022-04-06
Payer: COMMERCIAL

## 2022-04-06 DIAGNOSIS — R56.9 SEIZURES (H): ICD-10-CM

## 2022-04-06 DIAGNOSIS — K21.00 GASTROESOPHAGEAL REFLUX DISEASE WITH ESOPHAGITIS WITHOUT HEMORRHAGE: ICD-10-CM

## 2022-04-06 PROCEDURE — 99214 OFFICE O/P EST MOD 30 MIN: CPT | Mod: GT | Performed by: NURSE PRACTITIONER

## 2022-04-06 RX ORDER — PHENYTOIN SODIUM 100 MG/1
CAPSULE, EXTENDED RELEASE ORAL
Qty: 180 CAPSULE | Refills: 3 | Status: SHIPPED | OUTPATIENT
Start: 2022-04-06 | End: 2023-04-21

## 2022-04-06 RX ORDER — LANSOPRAZOLE 30 MG/1
CAPSULE, DELAYED RELEASE ORAL
Qty: 90 CAPSULE | Refills: 3 | Status: SHIPPED | OUTPATIENT
Start: 2022-04-06 | End: 2022-07-19

## 2022-04-06 NOTE — PROGRESS NOTES
Sophy is a 42 year old who is being evaluated via a billable video visit.      How would you like to obtain your AVS? MyChart  If the video visit is dropped, the invitation should be resent by: Text to cell phone: [phone]  Will anyone else be joining your video visit? No  Video Start Time: 1238    Assessment & Plan     Seizures (H)    - Phenytoin level; Future  - DILANTIN 100 MG capsule; TAKE 2 CAPSULES(200 MG) BY MOUTH AT BEDTIME    Gastroesophageal reflux disease with esophagitis without hemorrhage    - CBC with platelets; Future  - Basic metabolic panel  (Ca, Cl, CO2, Creat, Gluc, K, Na, BUN); Future  - Lipid panel reflex to direct LDL Fasting; Future  - ALT; Future  - LANsoprazole (PREVACID) 30 MG DR capsule; TAKE 1 CAPSULE(30 MG) BY MOUTH DAILY           F/u pending labs and 1 year.  The current medical regimen is effective;  continue present plan and medications.      Rica Ramirez NP  [facility]   Sophy is a 42 year old who presents for the following health issues     HPI     F/u GERD and siezures  Doing well on meds  Did not RTC last year for labs.  Offers no c/o      Review of Systems   Constitutional, HEENT, cardiovascular, pulmonary, gi and gu systems are negative, except as otherwise noted.      Objective           Vitals:  No vitals were obtained today due to virtual visit.    Physical Exam   GENERAL: Healthy, alert and no distress  EYES: Eyes grossly normal to inspection.  No discharge or erythema, or obvious scleral/conjunctival abnormalities.  RESP: No audible wheeze, cough, or visible cyanosis.  No visible retractions or increased work of breathing.    PSYCH: Mentation appears normal, affect normal/bright, judgement and insight intact, normal speech and appearance well-groomed.                Video-Visit Details    Type of service:  Video Visit    Video End Time:1245    Originating Location (pt. Location): Home    Distant Location (provider  location):  [facility]     Platform used for Video Visit: Dania

## 2022-06-02 ENCOUNTER — LAB (OUTPATIENT)
Dept: LAB | Facility: CLINIC | Age: 42
End: 2022-06-02
Payer: COMMERCIAL

## 2022-06-02 DIAGNOSIS — R56.9 SEIZURES (H): ICD-10-CM

## 2022-06-02 DIAGNOSIS — K21.00 GASTROESOPHAGEAL REFLUX DISEASE WITH ESOPHAGITIS WITHOUT HEMORRHAGE: ICD-10-CM

## 2022-06-02 LAB
ALT SERPL W P-5'-P-CCNC: 30 U/L (ref 0–70)
ANION GAP SERPL CALCULATED.3IONS-SCNC: 5 MMOL/L (ref 3–14)
BUN SERPL-MCNC: 14 MG/DL (ref 7–30)
CALCIUM SERPL-MCNC: 9.3 MG/DL (ref 8.5–10.1)
CHLORIDE BLD-SCNC: 109 MMOL/L (ref 94–109)
CHOLEST SERPL-MCNC: 208 MG/DL
CO2 SERPL-SCNC: 27 MMOL/L (ref 20–32)
CREAT SERPL-MCNC: 1.03 MG/DL (ref 0.66–1.25)
ERYTHROCYTE [DISTWIDTH] IN BLOOD BY AUTOMATED COUNT: 13.7 % (ref 10–15)
FASTING STATUS PATIENT QL REPORTED: ABNORMAL
GFR SERPL CREATININE-BSD FRML MDRD: >90 ML/MIN/1.73M2
GLUCOSE BLD-MCNC: 106 MG/DL (ref 70–99)
HCT VFR BLD AUTO: 44.5 % (ref 40–53)
HDLC SERPL-MCNC: 53 MG/DL
HGB BLD-MCNC: 15.3 G/DL (ref 13.3–17.7)
LDLC SERPL CALC-MCNC: 124 MG/DL
MCH RBC QN AUTO: 31.8 PG (ref 26.5–33)
MCHC RBC AUTO-ENTMCNC: 34.4 G/DL (ref 31.5–36.5)
MCV RBC AUTO: 93 FL (ref 78–100)
NONHDLC SERPL-MCNC: 155 MG/DL
PHENYTOIN SERPL-MCNC: 17.5 MG/L
PLATELET # BLD AUTO: 242 10E3/UL (ref 150–450)
POTASSIUM BLD-SCNC: 4.5 MMOL/L (ref 3.4–5.3)
RBC # BLD AUTO: 4.81 10E6/UL (ref 4.4–5.9)
SODIUM SERPL-SCNC: 141 MMOL/L (ref 133–144)
TRIGL SERPL-MCNC: 153 MG/DL
WBC # BLD AUTO: 6.2 10E3/UL (ref 4–11)

## 2022-06-02 PROCEDURE — 36415 COLL VENOUS BLD VENIPUNCTURE: CPT

## 2022-06-02 PROCEDURE — 85027 COMPLETE CBC AUTOMATED: CPT

## 2022-06-02 PROCEDURE — 80061 LIPID PANEL: CPT

## 2022-06-02 PROCEDURE — 80185 ASSAY OF PHENYTOIN TOTAL: CPT

## 2022-06-02 PROCEDURE — 84460 ALANINE AMINO (ALT) (SGPT): CPT

## 2022-06-02 PROCEDURE — 80048 BASIC METABOLIC PNL TOTAL CA: CPT

## 2022-09-10 ENCOUNTER — HEALTH MAINTENANCE LETTER (OUTPATIENT)
Age: 42
End: 2022-09-10

## 2023-04-21 DIAGNOSIS — R56.9 SEIZURES (H): ICD-10-CM

## 2023-04-21 RX ORDER — PHENYTOIN SODIUM 100 MG/1
CAPSULE, EXTENDED RELEASE ORAL
Qty: 60 CAPSULE | Refills: 0 | Status: SHIPPED | OUTPATIENT
Start: 2023-04-21 | End: 2023-05-24

## 2023-04-21 NOTE — TELEPHONE ENCOUNTER
The Dilantin prescription should come from neurology.  Neurology referral done  Please inform the patient

## 2023-04-21 NOTE — LETTER
April 24, 2023      Sophy Lozano  [address] [facility] [address]        Dear Sophy,     Charlene Beckett,      Susan Ramirez, CARLOS is no longer with our clinic.  You should call to schedule an appointment to establish care with a new Provider.  Per the covering Provider, your Dilantin prescription should come from neurology and she has placed a referral for you to schedule with them.  Below is the information.      Referral Details     Referred By   Referred To   Temi Ferrari  [address]   [facility] [address]   Phone: [phone]   Fax: [phone]    Diagnoses: Seizures (H)   Order: Adult Neurology  Referral        Comment: Please be aware that coverage of these services is subject to the terms and limitations of your health insurance plan.  Call member services at your health plan with any benefit or coverage questions.   [facility] [place] will call you to coordinate your care as prescribed by your provider. If you don't hear from a representative within 2 business days, please call [phone].         Please let us know if you need any thing further.      Your [facility] Team

## 2023-04-21 NOTE — TELEPHONE ENCOUNTER
Pending Prescriptions:                       Disp   Refills    DILANTIN 100 MG ER capsule [Pharmacy Med N*180 ca*3        Sig: TAKE 2 CAPSULES(200 MG) BY MOUTH AT BEDTIME    Routing refill request to provider for review/approval because:  Protocol failed.  Rica Ramirez is no longer within the organization.  Per the medication refill policy, the refill request is to be sent to the covering provider for review and recommendation.    Please advise, thanks.  Routed to covering provider - Dr. Fernandez.

## 2023-04-30 ENCOUNTER — HEALTH MAINTENANCE LETTER (OUTPATIENT)
Age: 43
End: 2023-04-30

## 2023-05-22 DIAGNOSIS — R56.9 SEIZURES (H): ICD-10-CM

## 2023-05-22 NOTE — LETTER
[place] [address] [facility]  [address]  [facility] [address]  Phone: [phone]        June 6, 2023      Sophy Lozano                                                                                                                                [address] [facility] [address]            Dear Mr. Lozano,        We recently provided you with a medication refill.      At this time we ask that: {APPT REQ:921158}    Your prescription: {PRESCRIPTION:103609}      Thank you,      {:[id number]}/ ***

## 2023-05-24 RX ORDER — PHENYTOIN SODIUM 100 MG/1
CAPSULE, EXTENDED RELEASE ORAL
Qty: 60 CAPSULE | Refills: 0 | Status: SHIPPED | OUTPATIENT
Start: 2023-05-24 | End: 2023-06-30

## 2023-05-24 NOTE — TELEPHONE ENCOUNTER
Pending Prescriptions:                       Disp   Refills    DILANTIN 100 MG ER capsule                 60 cap*0        Sig: TAKE 2 CAPSULES(200 MG) BY MOUTH AT BEDTIME   -- For           further refills patient needs appointment    Routing refill request to provider for review/approval because:  Maryjo given x1 and patient did not follow up, please advise

## 2023-05-24 NOTE — TELEPHONE ENCOUNTER
Patient's home/cell number no answer/mailbox full.  Patient's emergency home/cell number no answer/mailbox full.

## 2023-05-24 NOTE — TELEPHONE ENCOUNTER
Please inform the patient  The patient has an appointment with epilepsy center on July 7  We will refill the medications until July 7

## 2023-05-30 ENCOUNTER — TELEPHONE (OUTPATIENT)
Dept: INTERNAL MEDICINE | Facility: CLINIC | Age: 43
End: 2023-05-30
Payer: COMMERCIAL

## 2023-05-30 NOTE — TELEPHONE ENCOUNTER
Patient's home/cell number no answer/mailbox is full.  Patient's emergency contact home/cell number message for patient to call back.

## 2023-05-30 NOTE — TELEPHONE ENCOUNTER
Call received from patient requesting refill for Lansoprazole. Last prescription 7/19/22 #90 with 3 refills so patient should have refills through July. Call to pharmacy. Refills are available. Patient advised.

## 2023-06-06 NOTE — TELEPHONE ENCOUNTER
Patient aware . He will get refills from the epilepsy center . Has appoint in July 2023 .      non-radiating

## 2023-07-07 ENCOUNTER — OFFICE VISIT (OUTPATIENT)
Dept: NEUROLOGY | Facility: CLINIC | Age: 43
End: 2023-07-07
Attending: INTERNAL MEDICINE
Payer: COMMERCIAL

## 2023-07-07 VITALS
HEIGHT: 72 IN | HEART RATE: 78 BPM | BODY MASS INDEX: 33.54 KG/M2 | DIASTOLIC BLOOD PRESSURE: 80 MMHG | SYSTOLIC BLOOD PRESSURE: 125 MMHG | TEMPERATURE: 97 F | WEIGHT: 247.6 LBS

## 2023-07-07 DIAGNOSIS — R56.9 SEIZURES (H): ICD-10-CM

## 2023-07-07 ASSESSMENT — PAIN SCALES - GENERAL: PAINLEVEL: NO PAIN (0)
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
## 2023-07-10 RX ORDER — PHENYTOIN SODIUM 100 MG/1
CAPSULE, EXTENDED RELEASE ORAL
Qty: 180 CAPSULE | Refills: 3 | Status: SHIPPED | OUTPATIENT
Start: 2023-07-10 | End: 2024-06-21

## 2023-07-15 ENCOUNTER — HOSPITAL ENCOUNTER (EMERGENCY)
Facility: CLINIC | Age: 43
Discharge: HOME | End: 2023-07-15
Attending: PHYSICIAN ASSISTANT | Admitting: PHYSICIAN ASSISTANT
Payer: COMMERCIAL

## 2023-07-15 VITALS
RESPIRATION RATE: 18 BRPM | OXYGEN SATURATION: 97 % | HEART RATE: 80 BPM | SYSTOLIC BLOOD PRESSURE: 126 MMHG | DIASTOLIC BLOOD PRESSURE: 86 MMHG | TEMPERATURE: 97.1 F

## 2023-07-15 DIAGNOSIS — H66.001 ACUTE SUPPURATIVE OTITIS MEDIA OF RIGHT EAR WITHOUT SPONTANEOUS RUPTURE OF TYMPANIC MEMBRANE, RECURRENCE NOT SPECIFIED: ICD-10-CM

## 2023-07-15 PROCEDURE — 99283 EMERGENCY DEPT VISIT LOW MDM: CPT

## 2023-07-15 NOTE — ED TRIAGE NOTES
Pt with R ear pain x2 days. Denies cough, fever, sore throat.      Triage Assessment     Row Name 07/15/23 0947       Triage Assessment (Adult)    Airway WDL WDL       Respiratory WDL    Respiratory WDL WDL

## 2023-07-15 NOTE — ED PROVIDER NOTES
History   Chief Complaint:  Otalgia    HPI   Sophy Lozano is a 43 year old male presenting to the emergency department for evaluation of otalgia. Sophy reports right ear pain onset two days ago. He notes minor pain exacerbation with ear pulling. Denies ear drainage and changes in dentition. Denies fevers, chills, rhinorrhea, congestion, sore throat, and cough. Denies any known allergies to antibiotics. Denies recent use of antibiotics.     Independent Historian:   None - Patient Only    Review of External Notes:   None    Medications:    Dilantin   Prevacid  Omeprazole      Past Medical History:    GERD  Partial epilepsy with impairment of consciousness   Blindness of left eye     Past Surgical History:    Left eye removal and prosthetic     Physical Exam     Patient Vitals for the past 24 hrs:   BP Temp Temp src Pulse Resp SpO2   07/15/23 0948 126/86 97.1  F (36.2  C) Temporal 80 18 97 %     Physical Exam  Constitutional: Pleasant. Cooperative.   Eyes: Pupils equally round and reactive  HENT: Head is normal in appearance. Right TM is erythematous, dull, bulging. Left TM normal. Canals normal. Moist mucous membranes. No oropharyngeal erythema. No exudates. No palatal asymmetry. Uvula midline. No trismus. No muffled voice. Tolerating their secretions.  Cardiovascular: Regular rate and rhythm.  Respiratory: Normal respiratory effort, lungs are clear bilaterally.  Neck: Normal ROM.   Skin: Normal, without rash.  Neurologic: Cranial nerves grossly intact. Alert.  Nursing notes and vital signs reviewed.      Emergency Department Course     Emergency Department Course & Assessments:    Interventions:  Medications - No data to display     Assessments:  1011 I obtained history and examined the patient as noted above. I discussed findings and discharge with the patient. All questions answered.     Independent Interpretation (X-rays, CTs, rhythm strip):  None    Consultations/Discussion of Management or Tests:  None      Social Determinants of Health affecting care:   None    Disposition:  The patient was discharged to home.     Impression & Plan      Medical Decision Making:  Sophy Lozano is a 43 year old male who presents for evaluation of otalgia. The patient has an exam consistent with acute suppurative otitis media on the right side. There is no sign of mastoiditis, meningitis, perforation, mass, dental abscess, or peritonsillar abscess. There is no evidence of otitis externa. The patient will be started on antibiotics and may take Tylenol or Ibuprofen for pain. Return instructions for ED care given. Regardless they should see primary care doctor for ear recheck in 3-4 weeks. See primary physician in 3 days if symptoms not better or if new symptoms develop. All questions answered. Patient discharged to home in stable condition.    Diagnosis:    ICD-10-CM    1. Acute suppurative otitis media of right ear without spontaneous rupture of tympanic membrane, recurrence not specified  H66.001         Discharge Medications:  Discharge Medication List as of 7/15/2023 10:17 AM      START taking these medications    Details   amoxicillin-clavulanate (AUGMENTIN) 875-125 MG tablet Take 1 tablet by mouth 2 times daily for 7 days, Disp-14 tablet, R-0, E-Prescribe            Scribe Disclosure:  MILY, Jaycee Coffey, am serving as a scribe at 10:13 AM on 7/15/2023 to document services personally performed by Deangelo Huddleston PA-C based on my observations and the provider's statements to me.     7/15/2023   Deangelo Huddleston PA-C     This record was created at least in part using electronic voice recognition software, so please excuse any typographical errors.       Deangelo Huddleston PA-C  07/15/23 4747

## 2023-07-15 NOTE — DISCHARGE INSTRUCTIONS
Take the full course of antibiotics as prescribed.  For pain, you may take Tylenol 1000mg every 8 hours and ibuprofen 400mg every 6 hours for pain.

## 2023-09-26 DIAGNOSIS — K21.00 GASTROESOPHAGEAL REFLUX DISEASE WITH ESOPHAGITIS WITHOUT HEMORRHAGE: ICD-10-CM

## 2023-09-26 RX ORDER — LANSOPRAZOLE 30 MG/1
CAPSULE, DELAYED RELEASE ORAL
Qty: 90 CAPSULE | Refills: 3 | Status: CANCELLED | OUTPATIENT
Start: 2023-09-26

## 2023-09-26 NOTE — TELEPHONE ENCOUNTER
LANsoprazole (PREVACID) 30 MG DR capsule       Last Written Prescription Date:  07/19/22  Last Fill Quantity: 90,   # refills: 3  Last Office Visit: VV 04/06/22  Future Office visit:       Routing refill request to provider for review/approval because:  Drug not active on patient's medication list

## 2023-10-03 NOTE — TELEPHONE ENCOUNTER
See below messages. Refill team unable to refuse Rx for this reason. Please refuse it appropriate.

## 2023-10-03 NOTE — TELEPHONE ENCOUNTER
Patient number was called but the mail box is full. [employer] message was sent asking for her to make an in person appt     RN, can the med that is pended, be removed and then the encounter closed.

## 2023-11-09 DIAGNOSIS — K21.00 GASTROESOPHAGEAL REFLUX DISEASE WITH ESOPHAGITIS WITHOUT HEMORRHAGE: ICD-10-CM

## 2023-11-13 RX ORDER — LANSOPRAZOLE 30 MG/1
CAPSULE, DELAYED RELEASE ORAL
Qty: 90 CAPSULE | Refills: 3 | Status: SHIPPED | OUTPATIENT
Start: 2023-11-13

## 2023-11-13 RX ORDER — LANSOPRAZOLE 30 MG/1
CAPSULE, DELAYED RELEASE ORAL
Qty: 90 CAPSULE | Refills: 3 | OUTPATIENT
Start: 2023-11-13

## 2023-11-13 NOTE — TELEPHONE ENCOUNTER
Dr. Garrido has not ordered this medication before. Patient will need to reach out to original ordering provider for more refills.

## 2023-11-13 NOTE — TELEPHONE ENCOUNTER
LANsoprazole (PREVACID) 30 MG    Last Written Prescription Date:  7/7/23  Last Fill Quantity: 90,   # refills: 3  Last Office Visit : 7/7/23  Future Office visit:  NONE  RT  8 MOS   Routing refill request to provider for review/approval because:  Drug not on the refill protocol

## 2024-06-21 DIAGNOSIS — R56.9 SEIZURES (H): ICD-10-CM

## 2024-06-24 RX ORDER — PHENYTOIN SODIUM 100 MG/1
CAPSULE, EXTENDED RELEASE ORAL
Qty: 180 CAPSULE | Refills: 3 | Status: SHIPPED | OUTPATIENT
Start: 2024-06-24

## 2024-07-07 ENCOUNTER — HEALTH MAINTENANCE LETTER (OUTPATIENT)
Age: 44
End: 2024-07-07

## 2024-08-02 NOTE — TELEPHONE ENCOUNTER
Incoming call from patient stating that he is almost out of his meds and needs a refill, might have enough for a week or a few days.    4 = No assist / stand by assistance

## 2024-09-17 ENCOUNTER — OFFICE VISIT (OUTPATIENT)
Dept: NEUROLOGY | Facility: CLINIC | Age: 44
End: 2024-09-17
Payer: COMMERCIAL

## 2024-09-17 VITALS
OXYGEN SATURATION: 97 % | DIASTOLIC BLOOD PRESSURE: 84 MMHG | SYSTOLIC BLOOD PRESSURE: 133 MMHG | HEART RATE: 95 BPM | TEMPERATURE: 97.5 F

## 2024-09-17 DIAGNOSIS — G40.109 FOCAL EPILEPSY (H): Primary | ICD-10-CM

## 2024-09-17 LAB
ALT SERPL W P-5'-P-CCNC: 28 U/L (ref 0–70)
AST SERPL W P-5'-P-CCNC: 24 U/L (ref 0–45)
PHENYTOIN SERPL-MCNC: 12.8 UG/ML

## 2024-09-17 PROCEDURE — 84450 TRANSFERASE (AST) (SGOT): CPT | Mod: ORL | Performed by: PSYCHIATRY & NEUROLOGY

## 2024-09-17 PROCEDURE — 80186 ASSAY OF PHENYTOIN FREE: CPT | Mod: ORL | Performed by: PSYCHIATRY & NEUROLOGY

## 2024-09-17 PROCEDURE — 80185 ASSAY OF PHENYTOIN TOTAL: CPT | Mod: ORL | Performed by: PSYCHIATRY & NEUROLOGY

## 2024-09-17 PROCEDURE — 84460 ALANINE AMINO (ALT) (SGPT): CPT | Mod: ORL | Performed by: PSYCHIATRY & NEUROLOGY

## 2024-09-17 RX ORDER — IBUPROFEN AND DIPHENHYDRAMINE HCL 200; 25 MG/1; MG/1
1 CAPSULE, LIQUID FILLED ORAL DAILY
Qty: 90 TABLET | Refills: 3 | Status: SHIPPED | OUTPATIENT
Start: 2024-09-17

## 2024-09-17 ASSESSMENT — PATIENT HEALTH QUESTIONNAIRE - PHQ9
10. IF YOU CHECKED OFF ANY PROBLEMS, HOW DIFFICULT HAVE THESE PROBLEMS MADE IT FOR YOU TO DO YOUR WORK, TAKE CARE OF THINGS AT HOME, OR GET ALONG WITH OTHER PEOPLE: SOMEWHAT DIFFICULT
SUM OF ALL RESPONSES TO PHQ QUESTIONS 1-9: 14
SUM OF ALL RESPONSES TO PHQ QUESTIONS 1-9: 14

## 2024-09-17 ASSESSMENT — PAIN SCALES - GENERAL: PAINLEVEL: NO PAIN (0)

## 2024-09-17 NOTE — PROGRESS NOTES
"[facility] Epilepsy Care Progress Note      Patient:  Sophy Lozano  :  1980   Age:  44 year old   Today's Office Visit:  2024    Epilepsy Data:                    History of Present Illness:   ***      Current Outpatient Medications   Medication Sig Dispense Refill    Acetaminophen (TYLENOL PO)  (Patient not taking: Reported on 2023)      aspirin-acetaminophen-caffeine (EXCEDRIN MIGRAINE) 250-250-65 MG per tablet Take 1 tablet by mouth every 6 hours as needed for headaches      DILANTIN 100 MG ER capsule TAKE 2 CAPSULES(200 MG) BY MOUTH AT BEDTIME 180 capsule 3    LANsoprazole (PREVACID) 30 MG DR capsule TAKE 1 CAPSULE(30 MG) BY MOUTH DAILY 90 capsule 3        Medication Notes:  ***      AED Medication Compliance:  {COMPLIANCE:5303}  Using a pill box:  {YES / NO:131147::\"Yes\"}    Review of Systems:  Lethargy / Tiredness:  {Yes /No is default:671376}  Nausea / Vomiting:  {Yes /No is default:239359}  Double Vision:  {Yes /No is default:780430}  Sleepiness:  {Yes /No is default:961086}  Depression:  {Yes /No is default:907322}  Slowed Cognitive Function:  {Yes /No is default:647260}  Memory Problems:  {Yes /No is default:095622}  Poor Balance:  {Yes /No is default:736890}  Dizziness:  {Yes /No is default:900975}  Appetite Changes:  {Yes /No is default:758652}  Blurred Vision:  {Yes /No is default:961628}  Decreased Libido:  {Yes /No is default:279317}  Sleep Changes:  {Yes /No is default:843779}  Behavioral Changes:  {Yes /No is default:854754}  Skin: {Skin:100::\"negative\"}  Respiratory: {Resp:400::\"No shortness of breath, dyspnea on exertion, cough, or hemoptysis\"}  Cardiovascular: {CV:500::\"negative\"}  Have you experienced a traumatic fall since your last visit: {YES COMPLICATIONS/NO:387008}  Are these falls related to your seizures: {YES-LAI/NO:513427}    Other Issues:  ***  Is patient safe to drive:  {YES / NO:700372::\"Yes\"}    Exam:    There were no vitals taken for this visit.     Wt " Readings from Last 5 Encounters:   07/07/23 247 lb 9.6 oz (112.3 kg)   03/02/20 226 lb 3.2 oz (102.6 kg)   01/13/20 233 lb (105.7 kg)   02/26/19 233 lb 11.2 oz (106 kg)       General Appearance: {NORMAL/AB/NE:330327}  Gait:  {NORMAL/AB/NE:123791}  Attention Span:  {NORMAL/AB/NE:685437}  Language:  {NORMAL/AB/NE:632082}  Extraocular Movements:  {NORMAL/AB/NE:039649}  Coordination:  {NORMAL/AB/NE:777926}  Visual Fields:  {NORMAL/AB/NE:060915}  Facial Sensation:  {NORMAL/AB/NE:690669}  Facial Strength:  {NORMAL/AB/NE:653496}  Tongue Strength:  {NORMAL/AB/NE:189672}  Limb Strength:  {NORMAL/AB/NE:736966}  Limb Tone:  {NORMAL/AB/NE:056450}  Limb Sensation:  {NORMAL/AB/NE:710643}  General Physical Findings:  {NORMAL/AB/NE:178222}    Assessment and Plan:   ***    As described above, I met with the patient for *** minutes and during this time counseling was {:[id number]} 50% of the visit time.
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
## 2024-09-17 NOTE — PROGRESS NOTES
Depression Response    Patient completed the PHQ-9 assessment for depression and scored >9? Yes  Question 9 on the PHQ-9 was positive for suicidality? No  Does patient have current mental health provider? No    Is this a virtual visit? No    I personally notified the following: visit provider    Patient refuse a mental health referral at this time.      Answers submitted by the patient for this visit:  Patient Health Questionnaire (Submitted on 9/17/2024)  If you checked off any problems, how difficult have these problems made it for you to do your work, take care of things at home, or get along with other people?: Somewhat difficult  PHQ9 TOTAL SCORE: 14
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
## 2024-09-17 NOTE — LETTER
2024       RE: Sophy Lozano  : 1980   MRN: [id number]      To whom it may concern,    I saw Mr. Sophy Lozano at [facility] for his epilepsy. He is taking Dilantin which at times causes dizziness.  He may benefit from working from home when this happens.  Please provide the patient with this accomodation when he needs it.     Thank you,     Bina Whitfield MD

## 2024-09-19 LAB — PHENYTOIN FREE SERPL-MCNC: 1.2 UG/ML

## 2024-10-01 ENCOUNTER — ANCILLARY PROCEDURE (OUTPATIENT)
Dept: NEUROLOGY | Facility: CLINIC | Age: 44
End: 2024-10-01
Attending: PSYCHIATRY & NEUROLOGY
Payer: COMMERCIAL

## 2024-10-01 DIAGNOSIS — G40.109 FOCAL EPILEPSY (H): ICD-10-CM

## 2024-10-01 DIAGNOSIS — R56.9 SEIZURES (H): ICD-10-CM

## 2024-10-02 RX ORDER — PHENYTOIN SODIUM 100 MG/1
CAPSULE, EXTENDED RELEASE ORAL
Qty: 180 CAPSULE | Refills: 3 | OUTPATIENT
Start: 2024-10-02

## 2024-10-04 ENCOUNTER — TRANSFERRED RECORDS (OUTPATIENT)
Dept: HEALTH INFORMATION MANAGEMENT | Facility: CLINIC | Age: 44
End: 2024-10-04
Payer: COMMERCIAL

## 2024-10-24 ENCOUNTER — TELEPHONE (OUTPATIENT)
Dept: NEUROLOGY | Facility: CLINIC | Age: 44
End: 2024-10-24

## 2024-10-30 NOTE — TELEPHONE ENCOUNTER
Patient calling in to clinic, concerned that employer is going to close case due to not receiving form back. Informed patient that it can take up to two weeks to complete paperwork like this. Writer called Sveta Dennison to request extension as provider is not in clinic to complete paperwork. Patient will also email Sveta to request extension. Patient would like a call back when form is completed and sent out.

## 2024-10-31 NOTE — TELEPHONE ENCOUNTER
Form completed and faxed to number on paperwork. Will send to scanning and mail a copy to the patient.

## 2025-02-04 ENCOUNTER — VIRTUAL VISIT (OUTPATIENT)
Dept: NEUROLOGY | Facility: CLINIC | Age: 45
End: 2025-02-04
Payer: COMMERCIAL

## 2025-02-04 DIAGNOSIS — R56.9 SEIZURES (H): ICD-10-CM

## 2025-02-04 RX ORDER — PHENYTOIN SODIUM 100 MG/1
CAPSULE, EXTENDED RELEASE ORAL
Qty: 180 CAPSULE | Refills: 3 | Status: SHIPPED | OUTPATIENT
Start: 2025-02-04
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
## 2025-04-03 DIAGNOSIS — R56.9 SEIZURES (H): ICD-10-CM

## 2025-04-03 RX ORDER — PHENYTOIN SODIUM 100 MG/1
CAPSULE, EXTENDED RELEASE ORAL
Qty: 30 CAPSULE | OUTPATIENT
Start: 2025-04-03

## 2025-07-19 ENCOUNTER — HEALTH MAINTENANCE LETTER (OUTPATIENT)
Age: 45
End: 2025-07-19